# Patient Record
Sex: FEMALE | Race: BLACK OR AFRICAN AMERICAN | NOT HISPANIC OR LATINO | URBAN - METROPOLITAN AREA
[De-identification: names, ages, dates, MRNs, and addresses within clinical notes are randomized per-mention and may not be internally consistent; named-entity substitution may affect disease eponyms.]

---

## 2020-08-12 ENCOUNTER — OFFICE VISIT (OUTPATIENT)
Dept: URBAN - METROPOLITAN AREA CLINIC 92 | Facility: CLINIC | Age: 65
End: 2020-08-12
Payer: COMMERCIAL

## 2020-08-12 DIAGNOSIS — Z85.09 HISTORY OF GASTROINTESTINAL STROMAL TUMOR (GIST): ICD-10-CM

## 2020-08-12 DIAGNOSIS — R19.7 DIARRHEA OF PRESUMED INFECTIOUS ORIGIN: ICD-10-CM

## 2020-08-12 DIAGNOSIS — K62.5 RECTAL BLEEDING: ICD-10-CM

## 2020-08-12 PROBLEM — 43240000: Status: ACTIVE | Noted: 2020-08-12

## 2020-08-12 PROBLEM — 140731000119101: Status: ACTIVE | Noted: 2020-08-12

## 2020-08-12 PROBLEM — 12063002: Status: ACTIVE | Noted: 2020-08-12

## 2020-08-12 PROCEDURE — G8417 CALC BMI ABV UP PARAM F/U: HCPCS | Performed by: PHYSICIAN ASSISTANT

## 2020-08-12 PROCEDURE — G8427 DOCREV CUR MEDS BY ELIG CLIN: HCPCS | Performed by: PHYSICIAN ASSISTANT

## 2020-08-12 PROCEDURE — 99214 OFFICE O/P EST MOD 30 MIN: CPT | Performed by: PHYSICIAN ASSISTANT

## 2020-08-12 PROCEDURE — G9903 PT SCRN TBCO ID AS NON USER: HCPCS | Performed by: PHYSICIAN ASSISTANT

## 2020-08-12 PROCEDURE — 3017F COLORECTAL CA SCREEN DOC REV: CPT | Performed by: PHYSICIAN ASSISTANT

## 2020-08-12 RX ORDER — IMATINIB MESYLATE 400 MG/1
1 TABLET WITH A MEAL AND A LARGE GLASS OF WATER TABLET, FILM COATED ORAL ONCE A DAY
Status: ACTIVE | COMMUNITY

## 2020-08-12 NOTE — PHYSICAL EXAM NECK/THYROID:
normal appearance , no deformities , normal appearance , without tenderness upon palpation , no deformities , trachea midline , Thyroid normal size , no thyroid nodules , no masses , no JVD , thyroid nontender

## 2020-08-12 NOTE — PHYSICAL EXAM SKIN:
no rashes , no suspicious lesions , no areas of discoloration , no rashes , no suspicious lesions , no areas of discoloration , no jaundice present , good turgor , no masses , no tenderness on palpation

## 2020-08-12 NOTE — PHYSICAL EXAM GASTROINTESTINAL
Self Exam: Abdomen soft, non-tender to palpatation, non-distended , Abdomen , soft, nontender, nondistended , no guarding or rigidity , no masses palpable , normal bowel sounds , Liver and Spleen , no hepatomegaly present , no hepatosplenomegaly , liver nontender , spleen not palpable

## 2020-08-12 NOTE — PHYSICAL EXAM EYES:
Conjuntivae and eyelids appear normal , Sclerae : White without injection , Conjuntivae and eyelids appear normal, Sclerae : White without injection

## 2020-08-12 NOTE — PHYSICAL EXAM CHEST:
breathing is unlabored without accessory muscle use. , no lesions,  no deformities,  no traumatic injuries,  no significant scars are present,  chest wall non-tender,  no masses present,  tactile fremitus is normal, breathing is unlabored without accessory muscle use,normal breath sounds

## 2020-08-12 NOTE — HPI-TODAY'S VISIT:
65yoF with hx of recurrent gastrointestinal stromal tumor presents for eval of rectal bleeding. She was given  azithromycin on friday and started to have rectal bleeding and diarrhea right after starting the ABX. Blood was BRB and mixed in the stool.  The bleeding stopped yesterday after 3 days of bleeding, diarrhea still present despite being off ABX for 3 days. BMs up to 5/day, all loose, normally has 2-3 formed BM/day. No abd pain, N.V, fevers.   She had her last colonoscopy >10 years ago, normal per patient. No FH of colon cancer or polyps.   s/p resection of left upper quadrant mass 2004 treated with 2 months of post operative imatinib. Perigastric recurrence 2012 treated with preoperative imatinib through 2013 with surgery in 2014 Abdominal mass presenting July 2018, tx with  imatinib 400mg daily with good clinical response but toxicity with nausea, headache puffy eyes; imatinib dose reduced to 200mg daily  CT 5/20/20: Decreased size of metastatic implant along the right rectus sheath. Previously seen soft tissue nodule adjacent to the left bladder is not well seen on today's exam.  Decreased rim enhancement of hepatic segment 7 lesion, likely representing an involuting hepatic hemangioma, given the caudate hepatic lesion that was also characterized as a hepatic hemangioma had undergone involution previously. Cholelithiasis without acute cholecystitis.

## 2020-08-12 NOTE — PHYSICAL EXAM HENT:
Head , normocephalic , atraumatic , Face , Face within normal limits , Ears , External ears within normal limits , Nose/Nasopharynx , External nose  normal appearance , Mouth and Throat , Oral cavity appearance normal , Head, normocephalic, atraumatic, Face, Face within normal limits, Ears, External ears within normal limits, Nose/Nasopharynx, External nose  normal appearance, nares patent, no nasal discharge, Mouth and Throat, Oral cavity appearance normal, Breath odor normal, Lips, Appearance normal

## 2020-08-12 NOTE — PHYSICAL EXAM CONSTITUTIONAL:
pleasant, well nourished, well developed, in no acute distress , normal communication ability , well developed, well nourished , in no acute distress , ambulating without difficulty , normal communication ability

## 2020-08-12 NOTE — PHYSICAL EXAM NEUROLOGIC:
oriented to person, place and time , oriented to person, place and time , normal sensation , short and long term memory intact

## 2020-08-14 ENCOUNTER — LAB OUTSIDE AN ENCOUNTER (OUTPATIENT)
Dept: URBAN - METROPOLITAN AREA CLINIC 92 | Facility: CLINIC | Age: 65
End: 2020-08-14

## 2020-08-18 LAB
C DIFFICILE, CYTOTOXIN B: (no result)
Lab: (no result)

## 2020-09-08 ENCOUNTER — OFFICE VISIT (OUTPATIENT)
Dept: URBAN - METROPOLITAN AREA SURGERY CENTER 16 | Facility: SURGERY CENTER | Age: 65
End: 2020-09-08
Payer: COMMERCIAL

## 2020-09-08 DIAGNOSIS — K62.5 ANAL BLEEDING: ICD-10-CM

## 2020-09-08 PROCEDURE — G8907 PT DOC NO EVENTS ON DISCHARG: HCPCS | Performed by: INTERNAL MEDICINE

## 2020-09-08 PROCEDURE — 45378 DIAGNOSTIC COLONOSCOPY: CPT | Performed by: INTERNAL MEDICINE

## 2020-09-08 PROCEDURE — G9937 DIG OR SURV COLSCO: HCPCS | Performed by: INTERNAL MEDICINE

## 2020-09-25 ENCOUNTER — DASHBOARD ENCOUNTERS (OUTPATIENT)
Age: 65
End: 2020-09-25

## 2020-09-28 ENCOUNTER — OFFICE VISIT (OUTPATIENT)
Dept: URBAN - METROPOLITAN AREA TELEHEALTH 2 | Facility: TELEHEALTH | Age: 65
End: 2020-09-28

## 2020-09-28 DIAGNOSIS — R19.7 DIARRHEA OF PRESUMED INFECTIOUS ORIGIN: ICD-10-CM

## 2020-09-28 DIAGNOSIS — Z85.09 HISTORY OF GASTROINTESTINAL STROMAL TUMOR (GIST): ICD-10-CM

## 2020-09-28 DIAGNOSIS — K62.5 RECTAL BLEEDING: ICD-10-CM

## 2020-09-28 RX ORDER — IMATINIB MESYLATE 400 MG/1
1 TABLET WITH A MEAL AND A LARGE GLASS OF WATER TABLET, FILM COATED ORAL ONCE A DAY
Status: ACTIVE | COMMUNITY

## 2020-09-28 NOTE — HPI-TODAY'S VISIT:
65yoF with hx of recurrent gastrointestinal stromal tumor presents for eval of rectal bleeding. She was given  azithromycin last month and started to have rectal bleeding and diarrhea right after starting the ABX. Blood was  BRB and mixed in the stool.  The bleeding stopped yesterday after 3 days of bleeding, diarrhea still present despite being off ABX for 3 days. BMs up to 5/day, all loose, normally has 2-3 formed BM/day. No abd pain, N.V, fevers.   She had her last colonoscopy >10 years ago, normal per patient. No FH of colon cancer or polyps.   s/p resection of left upper quadrant mass 2004 treated with 2 months of post operative imatinib. Perigastric recurrence 2012 treated with preoperative imatinib through 2013 with surgery in 2014 Abdominal mass presenting July 2018, tx with  imatinib 400mg daily with good clinical response but toxicity with nausea, headache puffy eyes; imatinib dose reduced to 200mg daily  CT 5/20/20: Decreased size of metastatic implant along the right rectus sheath. Previously seen soft tissue nodule adjacent to the left bladder is not well seen on today's exam.  Decreased rim enhancement of hepatic segment 7 lesion, likely representing an involuting hepatic hemangioma, given the caudate hepatic lesion that was also characterized as a hepatic hemangioma had undergone involution previously. Cholelithiasis without acute cholecystitis.  She was put on florastor. C diff PCR neg as below.  Colon 9/8/2020 L sided diverticulosis (stool in sigmoid) + IH

## 2020-10-09 ENCOUNTER — OFFICE VISIT (OUTPATIENT)
Dept: URBAN - METROPOLITAN AREA TELEHEALTH 2 | Facility: TELEHEALTH | Age: 65
End: 2020-10-09